# Patient Record
Sex: MALE | Race: WHITE
[De-identification: names, ages, dates, MRNs, and addresses within clinical notes are randomized per-mention and may not be internally consistent; named-entity substitution may affect disease eponyms.]

---

## 2018-08-17 ENCOUNTER — HOSPITAL ENCOUNTER (INPATIENT)
Dept: HOSPITAL 45 - C.EDB | Age: 78
LOS: 2 days | Discharge: HOME | DRG: 176 | End: 2018-08-19
Attending: FAMILY MEDICINE | Admitting: FAMILY MEDICINE
Payer: COMMERCIAL

## 2018-08-17 VITALS
OXYGEN SATURATION: 94 % | HEART RATE: 68 BPM | SYSTOLIC BLOOD PRESSURE: 149 MMHG | DIASTOLIC BLOOD PRESSURE: 82 MMHG | TEMPERATURE: 98.96 F

## 2018-08-17 VITALS
TEMPERATURE: 98.96 F | SYSTOLIC BLOOD PRESSURE: 171 MMHG | OXYGEN SATURATION: 94 % | HEART RATE: 53 BPM | DIASTOLIC BLOOD PRESSURE: 84 MMHG

## 2018-08-17 VITALS
OXYGEN SATURATION: 95 % | DIASTOLIC BLOOD PRESSURE: 96 MMHG | SYSTOLIC BLOOD PRESSURE: 179 MMHG | TEMPERATURE: 97.7 F | HEART RATE: 68 BPM

## 2018-08-17 VITALS
HEART RATE: 68 BPM | DIASTOLIC BLOOD PRESSURE: 79 MMHG | OXYGEN SATURATION: 93 % | TEMPERATURE: 98.06 F | SYSTOLIC BLOOD PRESSURE: 175 MMHG

## 2018-08-17 VITALS — DIASTOLIC BLOOD PRESSURE: 96 MMHG | HEART RATE: 68 BPM | TEMPERATURE: 97.7 F | SYSTOLIC BLOOD PRESSURE: 179 MMHG

## 2018-08-17 VITALS — OXYGEN SATURATION: 95 %

## 2018-08-17 VITALS
BODY MASS INDEX: 24.66 KG/M2 | BODY MASS INDEX: 24.66 KG/M2 | WEIGHT: 182.1 LBS | HEIGHT: 72 IN | WEIGHT: 182.1 LBS | HEIGHT: 72 IN

## 2018-08-17 VITALS — OXYGEN SATURATION: 94 %

## 2018-08-17 DIAGNOSIS — Z88.8: ICD-10-CM

## 2018-08-17 DIAGNOSIS — M10.9: ICD-10-CM

## 2018-08-17 DIAGNOSIS — M35.3: ICD-10-CM

## 2018-08-17 DIAGNOSIS — I25.10: ICD-10-CM

## 2018-08-17 DIAGNOSIS — Z83.3: ICD-10-CM

## 2018-08-17 DIAGNOSIS — Z95.0: ICD-10-CM

## 2018-08-17 DIAGNOSIS — I26.99: Primary | ICD-10-CM

## 2018-08-17 DIAGNOSIS — I13.0: ICD-10-CM

## 2018-08-17 DIAGNOSIS — I50.22: ICD-10-CM

## 2018-08-17 DIAGNOSIS — N18.3: ICD-10-CM

## 2018-08-17 DIAGNOSIS — N40.1: ICD-10-CM

## 2018-08-17 DIAGNOSIS — R35.0: ICD-10-CM

## 2018-08-17 DIAGNOSIS — G30.9: ICD-10-CM

## 2018-08-17 DIAGNOSIS — F02.80: ICD-10-CM

## 2018-08-17 DIAGNOSIS — I25.5: ICD-10-CM

## 2018-08-17 LAB
ALBUMIN SERPL-MCNC: 3 GM/DL (ref 3.4–5)
ALP SERPL-CCNC: 62 U/L (ref 45–117)
ALT SERPL-CCNC: 23 U/L (ref 12–78)
AST SERPL-CCNC: 15 U/L (ref 15–37)
BASOPHILS # BLD: 0.02 K/UL (ref 0–0.2)
BASOPHILS NFR BLD: 0.2 %
BUN SERPL-MCNC: 21 MG/DL (ref 7–18)
CALCIUM SERPL-MCNC: 9 MG/DL (ref 8.5–10.1)
CO2 SERPL-SCNC: 28 MMOL/L (ref 21–32)
CREAT SERPL-MCNC: 0.91 MG/DL (ref 0.6–1.4)
EOS ABS #: 0.07 K/UL (ref 0–0.5)
EOSINOPHIL NFR BLD AUTO: 161 K/UL (ref 130–400)
GLUCOSE SERPL-MCNC: 96 MG/DL (ref 70–99)
HCT VFR BLD CALC: 43.3 % (ref 42–52)
HGB BLD-MCNC: 13.8 G/DL (ref 14–18)
IG#: 0.06 K/UL (ref 0–0.02)
IMM GRANULOCYTES NFR BLD AUTO: 13.4 %
INR PPP: 1 (ref 0.9–1.1)
LIPASE: 81 U/L (ref 73–393)
LYMPHOCYTES # BLD: 1.56 K/UL (ref 1.2–3.4)
MCH RBC QN AUTO: 30.7 PG (ref 25–34)
MCHC RBC AUTO-ENTMCNC: 31.9 G/DL (ref 32–36)
MCV RBC AUTO: 96.4 FL (ref 80–100)
MONO ABS #: 1.08 K/UL (ref 0.11–0.59)
MONOCYTES NFR BLD: 9.3 %
NEUT ABS #: 8.83 K/UL (ref 1.4–6.5)
NEUTROPHILS # BLD AUTO: 0.6 %
NEUTROPHILS NFR BLD AUTO: 76 %
PMV BLD AUTO: 10.1 FL (ref 7.4–10.4)
POTASSIUM SERPL-SCNC: 3.8 MMOL/L (ref 3.5–5.1)
PROT SERPL-MCNC: 7 GM/DL (ref 6.4–8.2)
PTT PATIENT: 25.1 SECONDS (ref 21–31)
PTT PATIENT: 67.1 SECONDS (ref 21–31)
RED CELL DISTRIBUTION WIDTH CV: 14.2 % (ref 11.5–14.5)
RED CELL DISTRIBUTION WIDTH SD: 50.7 FL (ref 36.4–46.3)
SODIUM SERPL-SCNC: 143 MMOL/L (ref 136–145)
WBC # BLD AUTO: 11.62 K/UL (ref 4.8–10.8)

## 2018-08-17 RX ADMIN — HEPARIN SODIUM SCH MLS/HR: 5000 INJECTION, SOLUTION INTRAVENOUS at 17:45

## 2018-08-17 RX ADMIN — MEMANTINE HYDROCHLORIDE SCH MG: 10 TABLET ORAL at 19:30

## 2018-08-17 NOTE — HISTORY AND PHYSICAL
History & Physical


Date & Time of Service:


Aug 17, 2018 at 15:25


Chief Complaint:


Chest Pain


Primary Care Physician:


No Doctor, Assigned


History of Present Illness


Source:  patient, family, hospital records


This is a 78 year old male with dementia and a past medical history of multi-

vessel CAD managed medically, ischemic cardiomyopathy and mild chronic systolic 

CHF with EF around 40-45%, complete heart block s/p permanent pacemaker in situ

, PMR on long-term steroids, gout, BPH - presents with R sided chest pain. 

Patient is coming from the [facility]. Most of the history is obtained by the 

patient's wife who is at bedside due to patient's underlying dementia. Wife 

states that the patient was lifting something heavy and had some R chest pain. 

Initially they thought nothing of it and thought it was related to a muscle 

pull - but the pain persisted. Upon arrival to the ED, chest CT was performed 

and he was found to have an extensive bilateral PE. Patient is laying 

comfortably. Requiring oxygen use as his O2 saturations were in the upper 80s 

on room air. Denies any other issues at this time.





Past Medical/Surgical History


Medical Problems:


(1) Alzheimer disease


(2) Atrioventricular block, complete


(3) BPH without obstruction/lower urinary tract symptoms


(4) Cardiac pacemaker in situ


(5) Coronary artery disease involving native coronary artery


(6) Gout


(7) HTN, goal below 140/90


(8) Kidney disease, chronic, stage III (GFR 30-59 ml/min)


(9) PMR (polymyalgia rheumatica)








Family History





FH: diabetes mellitus


FH: heart disease


High blood pressure





Social History


Smoking Status:  Unknown if Ever Smoked


Marital Status:  


Occupational Status:  retired





Allergies


Coded Allergies:  


     ACE Inhibitors (Unverified  Allergy, Unknown, cough, 8/17/18)


     Donepezil (Unverified  Allergy, Unknown, hallucination , 8/17/18)





Home Medications


Scheduled


Allopurinol (Zyloprim), 300 MG PO DAILY


Aspirin (Aspirin Ec), 81 MG PO DAILY


Atenolol (Tenormin), 50 MG PO QPM


Atorvastatin (Lipitor), 80 MG PO DAILY


Calcium/Vitamin D (Os-Jayy 500 Plus D), 1 TAB PO DAILY


Finasteride (Proscar), 5 MG PO DAILY


Losartan Potassium (Cozaar), 50 MG PO DAILY


Memantine (Namenda), 10 MG PO BID


Nitroglycerin (Nitrostat), 0.4 MG UT PRN


Pantoprazole (Protonix), 40 MG PO DAILY


Prednisone (Prednisone), 7.5 MG PO DAILY





Scheduled PRN


Hydrocodone/Acetaminophen 5MG/325MG (Norco 5MG/325MG), 1 TABLET PO Q6 PRN for 

Pain





Review of Systems


Difficult to obtain due to patient's mental status





Physical Exam


Vital Signs











  Date Time  Temp Pulse Resp B/P (MAP) Pulse Ox O2 Delivery O2 Flow Rate FiO2


 


8/17/18 15:02  66 16 161/86 97 Nasal Cannula 3.0 


 


8/17/18 13:37  62 24 112/78 98 Nasal Cannula 3.0 


 


8/17/18 12:47  77      


 


8/17/18 12:15     94 Nasal Cannula 2.0 


 


8/17/18 12:15     92 Nasal Cannula 2.0 


 


8/17/18 12:15 36.6 68 18 92/71 88 Room Air  


 


8/17/18 12:15     88 Room Air  








General Appearance:  no apparent distress, + pertinent finding (pleasantly 

demented)


Head:  normocephalic, atraumatic


Eyes:  normal inspection


ENT:  hearing grossly normal


Neck:  supple


Respiratory/Chest:  no respiratory distress, no accessory muscle use, + 

decreased breath sounds, + pertinent finding (+pacemaker, L chest wall)


Cardiovascular:  regular rate, rhythm, no edema, no murmur


Abdomen/GI:  normal bowel sounds, non tender, soft


Back:  no CVA tenderness, no muscle spasm


Extremities/Musculoskelatal:  normal inspection, no calf tenderness, normal 

capillary refill, no pedal edema, normal range of motion


Neurologic/Psych:  CNs II-XII nml as tested, no motor/sensory deficits, alert, 

normal mood/affect, oriented x 3


Skin:  normal color


Lymphatic:  no adenopathy





Diagnostics


Laboratory Results





Results Past 24 Hours








Test


  8/17/18


11:53 Range/Units


 


 


White Blood Count 11.62 4.8-10.8  K/uL


 


Red Blood Count 4.49 4.7-6.1  M/uL


 


Hemoglobin 13.8 14.0-18.0  g/dL


 


Hematocrit 43.3 42-52  %


 


Mean Corpuscular Volume 96.4   fL


 


Mean Corpuscular Hemoglobin 30.7 25-34  pg


 


Mean Corpuscular Hemoglobin


Concent 31.9


  32-36  g/dl


 


 


Platelet Count 161 130-400  K/uL


 


Mean Platelet Volume 10.1 7.4-10.4  fL


 


Neutrophils (%) (Auto) 76.0  %


 


Lymphocytes (%) (Auto) 13.4  %


 


Monocytes (%) (Auto) 9.3  %


 


Eosinophils (%) (Auto) 0.6  %


 


Basophils (%) (Auto) 0.2  %


 


Neutrophils # (Auto) 8.83 1.4-6.5  K/uL


 


Lymphocytes # (Auto) 1.56 1.2-3.4  K/uL


 


Monocytes # (Auto) 1.08 0.11-0.59  K/uL


 


Eosinophils # (Auto) 0.07 0-0.5  K/uL


 


Basophils # (Auto) 0.02 0-0.2  K/uL


 


RDW Standard Deviation 50.7 36.4-46.3  fL


 


RDW Coefficient of Variation 14.2 11.5-14.5  %


 


Immature Granulocyte % (Auto) 0.5  %


 


Immature Granulocyte # (Auto) 0.06 0.00-0.02  K/uL


 


Prothrombin Time


  10.7


  9.0-12.0


SECONDS


 


Prothromb Time International


Ratio 1.0


  0.9-1.1  


 


 


Activated Partial


Thromboplast Time 25.1


  21.0-31.0


SECONDS


 


Partial Thromboplastin Ratio 1.0  


 


Sodium Level 143 136-145  mmol/L


 


Potassium Level 3.8 3.5-5.1  mmol/L


 


Chloride Level 107   mmol/L


 


Carbon Dioxide Level 28 21-32  mmol/L


 


Anion Gap 7.0 3-11  mmol/L


 


Blood Urea Nitrogen 21 7-18  mg/dl


 


Creatinine


  0.91


  0.60-1.40


mg/dl


 


Est Creatinine Clear Calc


Drug Dose 73.4


   ml/min


 


 


Estimated GFR (


American) 93.2


   


 


 


Estimated GFR (Non-


American 80.4


   


 


 


BUN/Creatinine Ratio 22.6 10-20  


 


Random Glucose 96 70-99  mg/dl


 


Calcium Level 9.0 8.5-10.1  mg/dl


 


Total Bilirubin 1.0 0.2-1  mg/dl


 


Direct Bilirubin 0.3 0-0.2  mg/dl


 


Aspartate Amino Transf


(AST/SGOT) 15


  15-37  U/L


 


 


Alanine Aminotransferase


(ALT/SGPT) 23


  12-78  U/L


 


 


Alkaline Phosphatase 62   U/L


 


Troponin I < 0.015 0-0.045  ng/ml


 


Pro-B-Type Natriuretic Peptide 1709 0-1800  pg/ml


 


Total Protein 7.0 6.4-8.2  gm/dl


 


Albumin 3.0 3.4-5.0  gm/dl


 


Lipase 81   U/L











Diagnostic Radiology


CHEST ONE VIEW PORTABLE





CLINICAL HISTORY: CHEST PAIN dyspnea





COMPARISON STUDY:  No previous studies for comparison.





FINDINGS: Mild cardiomegaly.





Bipolar cardiac pacemaker. Diaphragms smooth. Lungs are clear. 





IMPRESSION:  Negative chest. 





(CHEST FOR PE) ANGIO WITH





CLINICAL HISTORY: 78 years-old Male presenting with 


^right sided CP, hypoxia. 





TECHNIQUE: Multidetector CT angiography of the chest was performed after


administration of intravenous contrast. 3-D volumetric and/or maximum intensity


projection (MIP) images were subsequently reconstructed for review. IV contrast:


103 mL of Optiray. A dose lowering technique was used consistent with the


principles of ALARA (as low as reasonably achievable).





COMPARISON: 8/17/2018.





CT DOSE (mGy.cm): The estimated cumulative dose is 416.95 mGy.cm.





FINDINGS:





 topogram: Left subclavian pacer with leads to the right atrium and right


ventricular apex. Cardiomegaly.





Pulmonary vasculature:





The study is adequate for assessment of the pulmonary vascular tree. Filling


defect consistent with acute pulmonary embolus in the distal right main


pulmonary artery extending significantly into the right upper lobe pulmonary


artery and its segmental branch vessels. In the distal right interlobar artery


there is also extensive acute pulmonary emboli. The left upper lobe is


essentially free from significant emboli. The left lower lobe demonstrates a


lesser degree of acute embolus burden. Main pulmonary artery top normal in size.


No flattening of the interventricular septum. No intracardiac filling defect. No


reflux of contrast into the hepatic veins.





Remaining chest:





On soft tissue windows, normal thyroid and thoracic inlet. Few subcentimeter


mediastinal lymph nodes, likely reactive. Aneurysmal dilatation of the ascending


aorta, which measures 4.9 cm in diameter. Atherosclerosis of the aortic arch.


Multichamber enlargement of the heart. Coronary artery and aortic valve


calcification. Small right pleural effusion. Borderline hepatic steatosis.





On lung windows, patchy groundglass opacity in a dependent distribution.


Additionally, there is peripheral or focal consolidation in the posterior


segment of the right upper lobe (series 4 image 149). Mild interlobular septal


thickening. Thickening of the peribronchovascular bundles. No pneumothorax.





On bone windows, degenerative changes of the spine.





IMPRESSION:


1.  Extensive acute pulmonary embolus burden bilaterally greater in the right


lung. No CT evidence of right heart strain at this time. Possible developing


pulmonary infarct in the posterior segment of the right upper lobe.





2.  Congestive changes in the lungs with either developing pulmonary edema or


extensive atelectasis.





3.  Small right pleural effusion.





4.  Cardiomegaly.





5.  Ascending aortic aneurysm measuring 4.9 cm in diameter.





The report will be called/faxed according to standard departmental protocol.





EKG


AV dual-paced rhythm with occasional ventricular-paced complexes and with 

frequent Premature ventricular


complexes





Impression


Assessment and Plan


This is a 78 year old male with dementia and a past medical history of multi-

vessel CAD managed medically, ischemic cardiomyopathy and mild chronic systolic 

CHF with EF around 40-45%, complete heart block s/p permanent pacemaker in situ

, PMR on long-term steroids, gout, BPH - presents with R sided chest pain and 

subsequently found to have acute bilateral pulmonary embolism.





Acute Bilateral PE


- patient had a chest CT - acute, extensive bilateral PE


- plan to start IV heparin


- case management consulted to find the cost of Xarelto vs. Eliquis


- wife does not want to start patient on Coumadin


- will obtain echo


- oxygen as needed


- pain medications as needed





Multi-vessel CAD


- managed medically


- last cardiac cath in September 2017


- continue aspirin, statin, b-blocker





Ischemic Cardiomyopathy


Mild Chronic Systolic CHF


- EF ~ 40-45%


- will check an echo


- avoiding IV fluids for now


- continue b-blocker and ARB





Complete Heart Block s/p PPM





Polymyalgia Rheumatica


- continue steroid use





BPH


- continue home medications


- check UA; wife states he's having more urinary frequency





FULL CODE





Resuscitation Status








VTE Prophylaxis


Will order VTE Prophylaxis:  Yes

## 2018-08-17 NOTE — DIAGNOSTIC IMAGING REPORT
(CHEST FOR PE) ANGIO WITH



CLINICAL HISTORY: 78 years-old Male presenting with 

^right sided CP, hypoxia. 



TECHNIQUE: Multidetector CT angiography of the chest was performed after

administration of intravenous contrast. 3-D volumetric and/or maximum intensity

projection (MIP) images were subsequently reconstructed for review. IV contrast:

103 mL of Optiray. A dose lowering technique was used consistent with the

principles of ALARA (as low as reasonably achievable).



COMPARISON: 8/17/2018.



CT DOSE (mGy.cm): The estimated cumulative dose is 416.95 mGy.cm.



FINDINGS:



 topogram: Left subclavian pacer with leads to the right atrium and right

ventricular apex. Cardiomegaly.



Pulmonary vasculature:



The study is adequate for assessment of the pulmonary vascular tree. Filling

defect consistent with acute pulmonary embolus in the distal right main

pulmonary artery extending significantly into the right upper lobe pulmonary

artery and its segmental branch vessels. In the distal right interlobar artery

there is also extensive acute pulmonary emboli. The left upper lobe is

essentially free from significant emboli. The left lower lobe demonstrates a

lesser degree of acute embolus burden. Main pulmonary artery top normal in size.

No flattening of the interventricular septum. No intracardiac filling defect. No

reflux of contrast into the hepatic veins.



Remaining chest:



On soft tissue windows, normal thyroid and thoracic inlet. Few subcentimeter

mediastinal lymph nodes, likely reactive. Aneurysmal dilatation of the ascending

aorta, which measures 4.9 cm in diameter. Atherosclerosis of the aortic arch.

Multichamber enlargement of the heart. Coronary artery and aortic valve

calcification. Small right pleural effusion. Borderline hepatic steatosis.



On lung windows, patchy groundglass opacity in a dependent distribution.

Additionally, there is peripheral or focal consolidation in the posterior

segment of the right upper lobe (series 4 image 149). Mild interlobular septal

thickening. Thickening of the peribronchovascular bundles. No pneumothorax.



On bone windows, degenerative changes of the spine.



IMPRESSION:

1.  Extensive acute pulmonary embolus burden bilaterally greater in the right

lung. No CT evidence of right heart strain at this time. Possible developing

pulmonary infarct in the posterior segment of the right upper lobe.



2.  Congestive changes in the lungs with either developing pulmonary edema or

extensive atelectasis.



3.  Small right pleural effusion.



4.  Cardiomegaly.



5.  Ascending aortic aneurysm measuring 4.9 cm in diameter.



The report will be called/faxed according to standard departmental protocol.







Electronically signed by:  Rajeev Gomez M.D.

8/17/2018 1:56 PM



Dictated Date/Time:  8/17/2018 1:48 PM

## 2018-08-17 NOTE — EMERGENCY ROOM VISIT NOTE
History


Report prepared by Philippe:  Kusum Orlando


Under the Supervision of:  Dr. Vladimir Phillips M.D.


First contact with patient:  12:17


Stated Complaint:  CHEST PAIN





History of Present Illness


The patient is a 78 year old male who presents to the Emergency Room with 

complaints of right sided chest pain beginning this morning. As per EMS, the 

patient was camping at [facility] and when he woke up this morning, he had 

right sided chest pain that progressively worsened. EMS states the pt rates his 

pain as an 8/10 in severity and when they got to the patient, he was gray 

colored, diaphoretic, and had an MI like presentation. The patient denies any 

nausea or headaches. HPI and ROS limited secondary to the patient's dementia.





   Source of History:  patient, EMS


   History Limited By:  dementia


   Onset:  this morning


   Position:  chest (right)


   Symptom Intensity:  8/10 in severity


   Quality:  other (chest pain)


   Timing:  worsening


   Associated Symptoms:  No headache, No nausea





Review of Systems


See HPI for pertinent positives and negatives.  HPI and ROS limited secondary 

to the patient's dementia.





Past Medical & Surgical


Medical Problems:


(1) Alzheimer disease


(2) Atrioventricular block, complete


(3) BPH without obstruction/lower urinary tract symptoms


(4) Cardiac pacemaker in situ


(5) Coronary artery disease involving native coronary artery


(6) Gout


(7) HTN, goal below 140/90


(8) Kidney disease, chronic, stage III (GFR 30-59 ml/min)


(9) PMR (polymyalgia rheumatica)








Family History





FH: diabetes mellitus


FH: heart disease


High blood pressure





Social History


Marital Status:  


Housing Status:  lives with significant other


Occupation Status:  retired





Current/Historical Medications


Scheduled


Allopurinol (Zyloprim), 300 MG PO DAILY


Aspirin (Aspirin Ec), 81 MG PO DAILY


Atenolol (Tenormin), 50 MG PO QPM


Atorvastatin (Lipitor), 80 MG PO DAILY


Calcium/Vitamin D (Os-Jayy 500 Plus D), 1 TAB PO DAILY


Finasteride (Proscar), 5 MG PO DAILY


Losartan Potassium (Cozaar), 50 MG PO DAILY


Memantine (Namenda), 10 MG PO BID


Nitroglycerin (Nitrostat), 0.4 MG UT PRN


Pantoprazole (Protonix), 40 MG PO DAILY


Prednisone (Prednisone), 7.5 MG PO DAILY





Scheduled PRN


Hydrocodone/Acetaminophen 5MG/325MG (Norco 5MG/325MG), 1 TABLET PO Q6 PRN for 

Pain





Allergies


Coded Allergies:  


     ACE Inhibitors (Unverified  Allergy, Unknown, cough, 8/17/18)


     Donepezil (Unverified  Allergy, Unknown, hallucination , 8/17/18)





Physical Exam


Vital Signs











  Date Time  Temp Pulse Resp B/P (MAP) Pulse Ox O2 Delivery O2 Flow Rate FiO2


 


8/17/18 13:37  62 24 112/78 98 Nasal Cannula 3.0 


 


8/17/18 12:47  77      


 


8/17/18 12:15     94 Nasal Cannula 2.0 


 


8/17/18 12:15     92 Nasal Cannula 2.0 


 


8/17/18 12:15 36.6 68 18 92/71 88 Room Air  


 


8/17/18 12:15     88 Room Air  











Physical Exam


GENERAL: Awake, alert, oriented to person only. 


HENT: Normocephalic, atraumatic. Oropharynx unremarkable.


EYES: Normal conjunctiva. Sclera non-icteric.


NECK: Supple. No nuchal rigidity. 


RESPIRATORY: Clear to auscultation. No wheezes. Normal respiratory effort.


CARDIAC: Normal rate. Irregular rhythm. Extremities warm and well perfused.


GI: Soft, non-distended. No tenderness to palpation. No rebound or guarding. No 

masses.


RECTAL: Deferred.


MUSCULOSKELETAL: Atraumatic. Chest examination reveals no tenderness. There is 

no CVA tenderness to palpation. 


LOWER EXTREMITIES: Calves are equal size bilaterally and non-tender. No edema


NEURO: Normal sensorium. No sensory or motor deficits noted. No facial droop.


SKIN: Warm and dry. No rash or jaundice noted.





Medical Decision & Procedures


ER Provider


Diagnostic Interpretation:


Radiology results as stated below per my review and radiologist interpretation: 





CHEST ONE VIEW PORTABLE





CLINICAL HISTORY: CHEST PAIN dyspnea





COMPARISON STUDY:  No previous studies for comparison.





FINDINGS: Mild cardiomegaly.





Bipolar cardiac pacemaker. Diaphragms smooth. Lungs are clear. 





IMPRESSION:  Negative chest. 








The above report was generated using voice recognition software.  It may contain


grammatical, syntax or spelling errors.








Electronically signed by:  Bryan Ozuna M.D.


8/17/2018 12:35 PM











(CHEST FOR PE) ANGIO WITH





CLINICAL HISTORY: 78 years-old Male presenting with 


^right sided CP, hypoxia. 





TECHNIQUE: Multidetector CT angiography of the chest was performed after


administration of intravenous contrast. 3-D volumetric and/or maximum intensity


projection (MIP) images were subsequently reconstructed for review. IV contrast:


103 mL of Optiray. A dose lowering technique was used consistent with the


principles of ALARA (as low as reasonably achievable).





COMPARISON: 8/17/2018.





CT DOSE (mGy.cm): The estimated cumulative dose is 416.95 mGy.cm.





FINDINGS:





 topogram: Left subclavian pacer with leads to the right atrium and right


ventricular apex. Cardiomegaly.





Pulmonary vasculature:





The study is adequate for assessment of the pulmonary vascular tree. Filling


defect consistent with acute pulmonary embolus in the distal right main


pulmonary artery extending significantly into the right upper lobe pulmonary


artery and its segmental branch vessels. In the distal right interlobar artery


there is also extensive acute pulmonary emboli. The left upper lobe is


essentially free from significant emboli. The left lower lobe demonstrates a


lesser degree of acute embolus burden. Main pulmonary artery top normal in size.


No flattening of the interventricular septum. No intracardiac filling defect. No


reflux of contrast into the hepatic veins.





Remaining chest:





On soft tissue windows, normal thyroid and thoracic inlet. Few subcentimeter


mediastinal lymph nodes, likely reactive. Aneurysmal dilatation of the ascending


aorta, which measures 4.9 cm in diameter. Atherosclerosis of the aortic arch.


Multichamber enlargement of the heart. Coronary artery and aortic valve


calcification. Small right pleural effusion. Borderline hepatic steatosis.





On lung windows, patchy groundglass opacity in a dependent distribution.


Additionally, there is peripheral or focal consolidation in the posterior


segment of the right upper lobe (series 4 image 149). Mild interlobular septal


thickening. Thickening of the peribronchovascular bundles. No pneumothorax.





On bone windows, degenerative changes of the spine.





IMPRESSION:


1.  Extensive acute pulmonary embolus burden bilaterally greater in the right


lung. No CT evidence of right heart strain at this time. Possible developing


pulmonary infarct in the posterior segment of the right upper lobe.





2.  Congestive changes in the lungs with either developing pulmonary edema or


extensive atelectasis.





3.  Small right pleural effusion.





4.  Cardiomegaly.





5.  Ascending aortic aneurysm measuring 4.9 cm in diameter.





The report will be called/faxed according to standard departmental protocol.








Electronically signed by:  Rajeev Gomez M.D.


8/17/2018 1:56 PM





Laboratory Results


8/17/18 11:53








Red Blood Count 4.49, Mean Corpuscular Volume 96.4, Mean Corpuscular Hemoglobin 

30.7, Mean Corpuscular Hemoglobin Concent 31.9, Mean Platelet Volume 10.1, 

Neutrophils (%) (Auto) 76.0, Lymphocytes (%) (Auto) 13.4, Monocytes (%) (Auto) 

9.3, Eosinophils (%) (Auto) 0.6, Basophils (%) (Auto) 0.2, Neutrophils # (Auto) 

8.83, Lymphocytes # (Auto) 1.56, Monocytes # (Auto) 1.08, Eosinophils # (Auto) 

0.07, Basophils # (Auto) 0.02





8/17/18 11:53

















Test


  8/17/18


11:53


 


White Blood Count


  11.62 K/uL


(4.8-10.8)


 


Red Blood Count


  4.49 M/uL


(4.7-6.1)


 


Hemoglobin


  13.8 g/dL


(14.0-18.0)


 


Hematocrit 43.3 % (42-52) 


 


Mean Corpuscular Volume


  96.4 fL


()


 


Mean Corpuscular Hemoglobin


  30.7 pg


(25-34)


 


Mean Corpuscular Hemoglobin


Concent 31.9 g/dl


(32-36)


 


Platelet Count


  161 K/uL


(130-400)


 


Mean Platelet Volume


  10.1 fL


(7.4-10.4)


 


Neutrophils (%) (Auto) 76.0 % 


 


Lymphocytes (%) (Auto) 13.4 % 


 


Monocytes (%) (Auto) 9.3 % 


 


Eosinophils (%) (Auto) 0.6 % 


 


Basophils (%) (Auto) 0.2 % 


 


Neutrophils # (Auto)


  8.83 K/uL


(1.4-6.5)


 


Lymphocytes # (Auto)


  1.56 K/uL


(1.2-3.4)


 


Monocytes # (Auto)


  1.08 K/uL


(0.11-0.59)


 


Eosinophils # (Auto)


  0.07 K/uL


(0-0.5)


 


Basophils # (Auto)


  0.02 K/uL


(0-0.2)


 


RDW Standard Deviation


  50.7 fL


(36.4-46.3)


 


RDW Coefficient of Variation


  14.2 %


(11.5-14.5)


 


Immature Granulocyte % (Auto) 0.5 % 


 


Immature Granulocyte # (Auto)


  0.06 K/uL


(0.00-0.02)


 


Anion Gap


  7.0 mmol/L


(3-11)


 


Est Creatinine Clear Calc


Drug Dose 73.4 ml/min 


 


 


Estimated GFR (


American) 93.2 


 


 


Estimated GFR (Non-


American 80.4 


 


 


BUN/Creatinine Ratio 22.6 (10-20) 


 


Calcium Level


  9.0 mg/dl


(8.5-10.1)


 


Total Bilirubin


  1.0 mg/dl


(0.2-1)


 


Direct Bilirubin


  0.3 mg/dl


(0-0.2)


 


Aspartate Amino Transf


(AST/SGOT) 15 U/L (15-37) 


 


 


Alanine Aminotransferase


(ALT/SGPT) 23 U/L (12-78) 


 


 


Alkaline Phosphatase


  62 U/L


()


 


Troponin I


  < 0.015 ng/ml


(0-0.045)


 


Pro-B-Type Natriuretic Peptide


  1709 pg/ml


(0-1800)


 


Total Protein


  7.0 gm/dl


(6.4-8.2)


 


Albumin


  3.0 gm/dl


(3.4-5.0)


 


Lipase


  81 U/L


()





Laboratory results reviewed by me





ECG Per My Interpretation


Indication:  chest pain


Rate (beats per minute):  77


Rhythm:  other (AV paced rhythm)


Findings:  other (no acute ST segment elevation, occasional PVC)


Comparison ECG Date:  no prior available





ED Course


1217: The patient was evaluated in room B9. A complete history and physical 

exam was performed.  





1310: I updated the patient and family on lab results. He is currently pain 

free. 





1411: Discussed the patient's case with ALDO Dolan . 

The patient will be evaluated for further treatment and disposition.





Medical Decision


Triage Nursing notes reviewed.





Differential diagnosis:


Etiologies such as cardiac ischemia, aortic dissection, pulmonary embolism, 

pneumonia, pneumothorax, musculoskeletal, infections, pericarditis, myocarditis

, esophageal rupture, gastrointestinal, as well as others were entertained. 





Patient presents by EMS report chest pain this morning.  Nitroglycerin with 

minimal relief of right-sided pain.  EMS was contacted given additional nitro 

and fentanyl with improvement of symptoms.  Has a pacemaker making difficult 

interpretation of EKG.  Troponin was sent.  Minimal right-sided pain now.  

Patient has a history of dementia no prior records here and attempts were made 

to get outside records from [facility].  Benign abdomen and doubt acute intra-

abdominal complication.  No acute mental status change reported. Chest x-ray to 

evaluate for possible pneumonia or pulmonary edema was sent.  Patient with a 

history of PMR on chronic steroids.  Family does report upon their arrival that 

he had some mild discomfort last night worsened today.  Took his hydrocodone 

earlier did not help.  Was lifting some water yesterday they think might have 

provoked this to some degree.  They state he has significant cardiac history 

with multiple blockages referred for possible bypass but not a good candidate 

secondary to his dementia and other comorbidities.  Initial troponin is 

negative here.  This was more than 4 hours after the symptoms.  No evidence of 

acutely decompensated heart failure. CT PE completed with evidence of bilateral 

pulmonary embolisms.  No evidence of right heart strain.  Negative troponin and 

very minimal proBNP.  Explains his hypoxia and some pulmonary infarct of the 

lung explains his pain.  Will start on IV heparin.  Discussed with Rodrigo 

for admission.





Medication Reconcilliation


Current Medication List:  was personally reviewed by me





Blood Pressure Screening


Patient's blood pressure:  Low blood pressure





Consults


Time Called:  1407


Consulting Physician:  ALDO Dolan


Returned Call:  1408


Discussed the patient's case with ALDO Dolan . The 

patient will be evaluated for further treatment and disposition.





Impression





 Primary Impression:  


 Bilateral pulmonary embolism





Critical Care


I have personally spent greater than 30 minutes of critical care time in the 

direct management of this patient.  This includes bedside care, interpretation 

of diagnostic studies, and testing, discussion with consultants, patient, and 

family members, and other required patient management activities.  This 30 

minutes is in excess of all separately billable procedures.





Scribe Attestation


The scribe's documentation has been prepared under my direction and personally 

reviewed by me in its entirety. I confirm that the note above accurately 

reflects all work, treatment, procedures, and medical decision making performed 

by me.





Departure Information


Dispostion


Being Evaluated By Hospitalist (ALDO Dolan)

## 2018-08-17 NOTE — DIAGNOSTIC IMAGING REPORT
CHEST ONE VIEW PORTABLE



CLINICAL HISTORY: CHEST PAIN dyspnea



COMPARISON STUDY:  No previous studies for comparison.



FINDINGS: Mild cardiomegaly.



Bipolar cardiac pacemaker. Diaphragms smooth. Lungs are clear. 



IMPRESSION:  Negative chest. 











The above report was generated using voice recognition software.  It may contain

grammatical, syntax or spelling errors.









Electronically signed by:  Bryan Ozuna M.D.

8/17/2018 12:35 PM



Dictated Date/Time:  8/17/2018 12:34 PM

## 2018-08-18 VITALS
TEMPERATURE: 98.6 F | HEART RATE: 68 BPM | SYSTOLIC BLOOD PRESSURE: 139 MMHG | OXYGEN SATURATION: 96 % | DIASTOLIC BLOOD PRESSURE: 78 MMHG

## 2018-08-18 VITALS
DIASTOLIC BLOOD PRESSURE: 79 MMHG | OXYGEN SATURATION: 97 % | HEART RATE: 64 BPM | SYSTOLIC BLOOD PRESSURE: 142 MMHG | TEMPERATURE: 97.7 F

## 2018-08-18 VITALS
TEMPERATURE: 97.34 F | HEART RATE: 69 BPM | SYSTOLIC BLOOD PRESSURE: 137 MMHG | OXYGEN SATURATION: 96 % | DIASTOLIC BLOOD PRESSURE: 76 MMHG

## 2018-08-18 VITALS — OXYGEN SATURATION: 95 %

## 2018-08-18 VITALS
OXYGEN SATURATION: 95 % | SYSTOLIC BLOOD PRESSURE: 169 MMHG | HEART RATE: 63 BPM | DIASTOLIC BLOOD PRESSURE: 89 MMHG | TEMPERATURE: 98.24 F

## 2018-08-18 VITALS
HEART RATE: 68 BPM | OXYGEN SATURATION: 97 % | TEMPERATURE: 98.24 F | DIASTOLIC BLOOD PRESSURE: 85 MMHG | SYSTOLIC BLOOD PRESSURE: 132 MMHG

## 2018-08-18 VITALS — OXYGEN SATURATION: 98 %

## 2018-08-18 VITALS
DIASTOLIC BLOOD PRESSURE: 87 MMHG | OXYGEN SATURATION: 93 % | TEMPERATURE: 97.88 F | HEART RATE: 62 BPM | SYSTOLIC BLOOD PRESSURE: 154 MMHG

## 2018-08-18 VITALS — OXYGEN SATURATION: 94 %

## 2018-08-18 LAB
BUN SERPL-MCNC: 19 MG/DL (ref 7–18)
CALCIUM SERPL-MCNC: 8.5 MG/DL (ref 8.5–10.1)
CO2 SERPL-SCNC: 29 MMOL/L (ref 21–32)
CREAT SERPL-MCNC: 0.72 MG/DL (ref 0.6–1.4)
EOSINOPHIL NFR BLD AUTO: 135 K/UL (ref 130–400)
GLUCOSE SERPL-MCNC: 95 MG/DL (ref 70–99)
HCT VFR BLD CALC: 38.9 % (ref 42–52)
HGB BLD-MCNC: 12.6 G/DL (ref 14–18)
MCH RBC QN AUTO: 31 PG (ref 25–34)
MCHC RBC AUTO-ENTMCNC: 32.4 G/DL (ref 32–36)
MCV RBC AUTO: 95.8 FL (ref 80–100)
PMV BLD AUTO: 9.7 FL (ref 7.4–10.4)
POTASSIUM SERPL-SCNC: 3.6 MMOL/L (ref 3.5–5.1)
PTT PATIENT: 68.1 SECONDS (ref 21–31)
PTT PATIENT: 92.6 SECONDS (ref 21–31)
RED CELL DISTRIBUTION WIDTH CV: 14 % (ref 11.5–14.5)
RED CELL DISTRIBUTION WIDTH SD: 49.4 FL (ref 36.4–46.3)
SODIUM SERPL-SCNC: 143 MMOL/L (ref 136–145)
WBC # BLD AUTO: 8.31 K/UL (ref 4.8–10.8)

## 2018-08-18 RX ADMIN — DOCUSATE SODIUM SCH MG: 100 CAPSULE, LIQUID FILLED ORAL at 20:03

## 2018-08-18 RX ADMIN — LOSARTAN POTASSIUM SCH MG: 50 TABLET, FILM COATED ORAL at 07:21

## 2018-08-18 RX ADMIN — PANTOPRAZOLE SCH MG: 40 TABLET, DELAYED RELEASE ORAL at 07:19

## 2018-08-18 RX ADMIN — MEMANTINE HYDROCHLORIDE SCH MG: 10 TABLET ORAL at 07:19

## 2018-08-18 RX ADMIN — ALLOPURINOL SCH MG: 300 TABLET ORAL at 07:21

## 2018-08-18 RX ADMIN — Medication SCH TAB: at 07:18

## 2018-08-18 RX ADMIN — FINASTERIDE SCH MG: 5 TABLET, FILM COATED ORAL at 07:21

## 2018-08-18 RX ADMIN — HEPARIN SODIUM SCH MLS/HR: 5000 INJECTION, SOLUTION INTRAVENOUS at 11:40

## 2018-08-18 RX ADMIN — Medication SCH MG: at 07:20

## 2018-08-18 RX ADMIN — MEMANTINE HYDROCHLORIDE SCH MG: 10 TABLET ORAL at 20:05

## 2018-08-18 RX ADMIN — RIVAROXABAN SCH MG: 15 TABLET, FILM COATED ORAL at 20:03

## 2018-08-18 RX ADMIN — ATORVASTATIN CALCIUM SCH MG: 40 TABLET, FILM COATED ORAL at 07:20

## 2018-08-18 NOTE — ECHOCARDIOGRAM REPORT
*NOTICE TO RECEIVING PARTY AGENCY**  This information is strictly Confidential and protected under 
[place] law.  [place] law prohibits you from making any further disclosure of this 
information unless further disclosure is expressly permitted by the written consent of the person to 
whom it pertains or is authorized by law.  A general authorization for the release of medical or 
other information is not sufficient for this purpose.  Hospital accepts no responsibility if the 
information is made available to any other person, INCLUDING THE PATIENT.



Interpretation Summary

  *  Conclusions --

  *  Compared to previous study of 7/26/17: RV dilation and reduced systolic function are now 
present, no change in LV systolic function.

  *  Normal LV chamber size with moderate concentric LVH.

  *  The qualitative LV ejection fraction is 40-44% (mioderatly reduced). There is a large sized 
apical, septal, anteroseptal, anterior, and inferior wall motion abnormality with hypokinesis to 
akinesis of the segments.

  *  Grade I diastolic dysfunction.

  *  The right ventricular cavity size is enlarged (proximal parasternal long axis right ventricular 
outflow tract dimension >3.3 cm). The right ventricular systolic function is reduced as assessed by 
tricuspid annular plane systolic excursion (TAPSE) (TAPSE <1.6 cm).

  *  Aortic valve sclerosis moderate, without significant aortic valvular stenosis. Mild aortic 
regurgitation.

  *  Mild tricuspid regurgitation.

  *  Pulmonary hypertension is present. PASP of 53 mmHg.

  *  Severe biatrial enlargement.

Procedure Details

  *  A complete two-dimensional transthoracic echocardiogram was performed (2D, M-mode, Doppler and 
color flow Doppler).

Left Ventricle

  *  The left ventricle is normal in size.

  *  There is moderate concentric left ventricular hypertrophy.

  *  Left ventricular systolic function is moderately reduced.

  *  There is a large sized apical, septal, anteroseptal, anterior, and inferior wall motion 
abnormality with hypokinesis to akinesis of the segments.

Right Ventricle

  *  The right ventricular cavity size is enlarged (proximal parasternal long axis right ventricular 
outflow tract dimension >3.3 cm).

  *  There is a pacemaker lead in the right ventricle.

  *  The right ventricular systolic function is reduced as assessed by tricuspid annular plane 
systolic excursion (TAPSE) (TAPSE <1.6 cm).

Atria

  *  The left atrium is severely dilated.

  *  The right atrium is severely dilated.

  *  No ASD detected; PFO is not assessed.

Mitral Valve

  *  The mitral valve leaflets appear thickened, but open well.

  *  There is no mitral valve stenosis.

  *  There is no mitral regurgitation noted.

Tricuspid Valve

  *  The tricuspid valve is normal in structure and function.

Aortic Valve

  *  The aortic valve is trileaflet.

  *  Aortic valve sclerosis moderate, without significant aortic valvular stenosis.

  *  Mild aortic regurgitation.

Pulmonic Valve

  *  The pulmonary valve is not well seen, but the Doppler examination is normal without significant 
regurgitation or stenosis.

Great Vessels

  *  The aortic root is normal size.

  *  Moderately dilated ascending aorta.

Pericardium/Pleural

  *  There is no pericardial effusion.

Left Ventricular Diastolic Function

  *  Grade I diastolic dysfunction, (abnormal relaxation pattern).



MMode 2D Measurements and Calculations

IVSd 1.6 cm

IVSs 2.2 cm



LVIDd 4.9 cm

LVIDs 4.3 cm

LVPWd 1.6 cm

LVPWs 1.7 cm



IVS/LVPW 0.98 

FS 13.0 %

EDV(Teich) 114.2 ml

ESV(Teich) 82.5 ml

EF(Teich) 27.8 %



EDV(cubed) 119.5 ml

ESV(cubed) 78.8 ml

EF(cubed) 34.1 %

% IVS thick 43.0 %

% LVPW thick 8.9 %





LV mass(C)d 336.5 grams

LV mass(C)dI 165.2 grams/m\S\2

LV mass(C)s 398.3 grams

LV mass(C)sI 195.5 grams/m\S\2



SV(Teich) 31.7 ml

SI(Teich) 15.6 ml/m\S\2

SV(cubed) 40.7 ml

SI(cubed) 20.0 ml/m\S\2



Ao root diam 3.7 cm

Ao root area 10.5 cm\S\2

ACS 2.2 cm

LA dimension 3.7 cm



LA/Ao 1.0 

LVOT diam 2.3 cm

LVOT area 4.1 cm\S\2





LVAd ap4 41.5 cm\S\2

LVLd ap4 8.7 cm

EDV(MOD-sp4) 160.4 ml

EDV(sp4-el) 168.8 ml

LVAs ap4 26.3 cm\S\2

LVLs ap4 7.7 cm

ESV(MOD-sp4) 75.6 ml

ESV(sp4-el) 76.6 ml

EF(MOD-sp4) 52.9 %

EF(sp4-el) 54.6 %



LVAd ap2 43.7 cm\S\2

LVLd ap2 8.9 cm

EDV(MOD-sp2) 175.4 ml

EDV(sp2-el) 182.6 ml

LVAs ap2 32.1 cm\S\2

LVLs ap2 8.1 cm

ESV(MOD-sp2) 106.4 ml

ESV(sp2-el) 107.7 ml

EF(MOD-sp2) 39.4 %

EF(sp2-el) 41.0 %



LVLd %diff 2.3 %

EDV(MOD-bp) 170.2 ml

LVLs %diff 5.7 %

ESV(MOD-bp) 92.5 ml

EF(MOD-bp) 45.6 %



SV(MOD-sp4) 84.8 ml

SI(MOD-sp4) 41.6 ml/m\S\2





SV(MOD-sp2) 69.0 ml

SI(MOD-sp2) 33.9 ml/m\S\2



SV(MOD-bp) 77.7 ml

SI(MOD-bp) 38.1 ml/m\S\2



SV(sp4-el) 92.2 ml

SI(sp4-el) 45.3 ml/m\S\2



SV(sp2-el) 74.9 ml

SI(sp2-el) 36.8 ml/m\S\2







Doppler Measurements and Calculations

MV E max shama 49.6 cm/sec

MV A max shama 71.2 cm/sec



MV E/A 0.70 



MV P1/2t max shama 63.0 cm/sec

MV P1/2t 96.2 msec

MVA(P1/2t) 2.3 cm\S\2

MV dec slope 191.7 cm/sec\S\2

MV dec time 0.26 sec



Ao V2 max 164.4 cm/sec

Ao max PG 10.8 mmHg

Ao max PG (full) 8.7 mmHg

KATHRIN(V,A) 1.8 cm\S\2

KATHRIN(V,D) 1.8 cm\S\2





AI max shama 385.2 cm/sec

AI max PG 59.4 mmHg

AI dec slope 125.7 cm/sec\S\2

AI P1/2t 897.8 msec



LV V1 max PG 2.2 mmHg



LV V1 max 73.3 cm/sec



MR max shama 594.9 cm/sec

MR max .6 mmHg





TR max shama 336.5 cm/sec

## 2018-08-18 NOTE — PROGRESS NOTE
Subjective


Date of Service:


Aug 18, 2018.


Subjective


Pt evaluation today including:  conversation w/ patient, conversation w/ family

, physical exam, lab review, review of studies, review of inpatient medication 

list


Saw/examined the patient in room 238


Doing well, he is pleasantly demented, but in no distress


Last evening, as per wife, he had a lot of pain


the pain has since subsided and currently he is laying and making occasional 

jokes


As per wife, he seems to be breathing better; currently on 2L of O2 via NC





Problem List


Medical Problems:


(1) Bilateral pulmonary embolism


Status: Acute  











Medications





Current Inpatient Medications








 Medications


  (Trade)  Dose


 Ordered  Sig/Anastacio


 Route  Start Time


 Stop Time Status Last Admin


Dose Admin


 


 Ioversol


  (Optiray 320)  100 ml  UD  PRN


 IV  8/17/18 13:30


 8/21/18 13:29   


 


 


 Acetaminophen


  (Tylenol Tab)  650 mg  Q4H  PRN


 PO  8/17/18 15:30


 9/16/18 15:29   


 


 


 Al Hydrox/Mg


 Hydrox/Simethicone


  (Maalox Max Susp)  15 ml  Q4H  PRN


 PO  8/17/18 15:30


 9/16/18 15:29   


 


 


 Magnesium


 Hydroxide


  (Milk Of


 Magnesia Susp)  30 ml  Q12H  PRN


 PO  8/17/18 15:30


 9/16/18 15:29   


 


 


 Ondansetron HCl


  (Zofran Inj)  4 mg  Q6H  PRN


 IV  8/17/18 15:30


 9/16/18 15:29   


 


 


 Allopurinol


  (Zyloprim Tab)  300 mg  DAILY


 PO  8/18/18 09:00


 9/17/18 08:59  8/18/18 07:21


300 MG


 


 Aspirin


  (Ecotrin Tab)  81 mg  DAILY


 PO  8/18/18 09:00


 9/17/18 08:59  8/18/18 07:20


81 MG


 


 Atenolol


  (Tenormin Tab)  50 mg  QPM


 PO  8/17/18 21:00


 9/16/18 20:59  8/17/18 19:30


50 MG


 


 Atorvastatin


 Calcium


  (Lipitor Tab)  80 mg  DAILY


 PO  8/18/18 09:00


 9/17/18 08:59  8/18/18 07:20


80 MG


 


 Calcium/Vitamin D


  (Caltrate Plus


 Tab)  1 tab  DAILY


 PO  8/18/18 09:00


 9/17/18 08:59  8/18/18 07:18


1 TAB


 


 Finasteride


  (Proscar Tab)  5 mg  DAILY


 PO  8/18/18 09:00


 9/17/18 08:59  8/18/18 07:21


5 MG


 


 Acetaminophen/


 Hydrocodone Bitart


  (Norco 5/325 Tab)  1 tab  Q6  PRN


 PO  8/17/18 15:30


 8/31/18 15:29  8/17/18 23:24


1 TAB


 


 Losartan Potassium


  (coZAAR TAB)  50 mg  DAILY


 PO  8/18/18 09:00


 9/17/18 08:59  8/18/18 07:21


50 MG


 


 Memantine


  (Namenda Tab)  10 mg  BID


 PO  8/17/18 21:00


 9/16/18 20:59  8/18/18 07:19


10 MG


 


 Nitroglycerin


  (Nitrostat Tab)  0.4 mg  UD  PRN


 UT  8/17/18 15:30


 9/16/18 15:29   


 


 


 Pantoprazole


 Sodium


  (Protonix Tab)  40 mg  DAILY


 PO  8/18/18 09:00


 9/17/18 08:59  8/18/18 07:19


40 MG


 


 Prednisone


  (PredniSONE TAB)  7.5 mg  DAILY


 PO  8/18/18 09:00


 9/17/18 08:59  8/18/18 07:18


7.5 MG


 


 Heparin Sodium/


 Dextrose  500 ml @ 


 26 mls/hr  [id number]


 IV  8/17/18 17:45


 8/18/18 21:00  8/18/18 11:40


26 MLS/HR


 


 Morphine Sulfate


  (MoRPHine


 SULFATE INJ)  4 mg  Q3H  PRN


 IV  8/17/18 23:30


 8/31/18 21:29   


 


 


 Rivaroxaban


  (Xarelto Tab)  15 mg  BID


 PO  8/18/18 21:00


 9/17/18 20:59   


 


 


 Miscellaneous


  (Stop Order)  1 ea  TODAY@2100


 N/A  8/18/18 21:00


 8/18/18 22:00   


 











Objective


Vital Signs











  Date Time  Temp Pulse Resp B/P (MAP) Pulse Ox O2 Delivery O2 Flow Rate FiO2


 


8/18/18 14:57 36.8 68 20 132/85 (101) 97 Nasal Cannula 3.0 


 


8/18/18 10:37 36.5 64 20 142/79 (100) 97 Nasal Cannula 3.0 


 


8/18/18 08:00     97 Nasal Cannula 3.0 


 


8/18/18 08:00     95 Nasal Cannula 3.0 


 


8/18/18 07:43 36.8 63 20 169/89 (115) 95 Nasal Cannula 3.0 


 


8/18/18 03:46 37.0 68 18 139/78 (98) 96 Room Air  


 


8/18/18 00:17     98 Nasal Cannula 3.0 


 


8/17/18 22:59 36.7 68 18 175/79 (111) 93 Nasal Cannula  


 


8/17/18 21:15 37.2 68 20 149/82 (104) 94 Nasal Cannula 3.0 


 


8/17/18 20:00     94 Nasal Cannula 3.0 


 


8/17/18 19:31 37.2 53 20 171/84 (113) 94 Nasal Cannula 3.0 


 


8/17/18 17:29     95 Nasal Cannula 3.0 


 


8/17/18 17:16 36.5 68 18 179/96    











Physical Exam


General Appearance:  no apparent distress, + pertinent finding (pleasantly 

demented)


Respiratory/Chest:  no respiratory distress, no accessory muscle use, + 

decreased breath sounds


Cardiovascular:  regular rate, rhythm, no edema, no murmur





Laboratory Results





Last 24 Hours








Test


  8/17/18


21:00 8/17/18


21:14 8/18/18


03:30 8/18/18


09:02


 


Urine Color DK YELLOW    


 


Urine Appearance CLEAR    


 


Urine pH 5.5    


 


Urine Specific Gravity > 1.045    


 


Urine Protein 2+    


 


Urine Glucose (UA) NEG    


 


Urine Ketones TRACE    


 


Urine Occult Blood 1+    


 


Urine Nitrite NEG    


 


Urine Bilirubin NEG    


 


Urine Urobilinogen POS    


 


Urine Leukocyte Esterase NEG    


 


Urine WBC (Auto) 1-5 /hpf    


 


Urine RBC (Auto) 5-10 /hpf    


 


Urine Hyaline Casts (Auto) 1-5 /lpf    


 


Urine Epithelial Cells (Auto) >30 /lpf    


 


Urine Bacteria (Auto) NEG    


 


Activated Partial


Thromboplast Time 


  67.1 SECONDS 


  92.6 SECONDS 


  


 


 


Partial Thromboplastin Ratio  2.6  3.6  


 


Troponin I  0.016 ng/ml  0.028 ng/ml  < 0.015 ng/ml 


 


White Blood Count   8.31 K/uL  


 


Red Blood Count   4.06 M/uL  


 


Hemoglobin   12.6 g/dL  


 


Hematocrit   38.9 %  


 


Mean Corpuscular Volume   95.8 fL  


 


Mean Corpuscular Hemoglobin   31.0 pg  


 


Mean Corpuscular Hemoglobin


Concent 


  


  32.4 g/dl 


  


 


 


RDW Standard Deviation   49.4 fL  


 


RDW Coefficient of Variation   14.0 %  


 


Platelet Count   135 K/uL  


 


Mean Platelet Volume   9.7 fL  


 


Sodium Level   143 mmol/L  


 


Potassium Level   3.6 mmol/L  


 


Chloride Level   106 mmol/L  


 


Carbon Dioxide Level   29 mmol/L  


 


Anion Gap   8.0 mmol/L  


 


Blood Urea Nitrogen   19 mg/dl  


 


Creatinine   0.72 mg/dl  


 


Est Creatinine Clear Calc


Drug Dose 


  


  92.8 ml/min 


  


 


 


Estimated GFR (


American) 


  


  103.6 


  


 


 


Estimated GFR (Non-


American 


  


  89.3 


  


 


 


BUN/Creatinine Ratio   27.1  


 


Random Glucose   95 mg/dl  


 


Calcium Level   8.5 mg/dl  


 


Magnesium Level   2.1 mg/dl  


 


Test


  8/18/18


10:47 8/18/18


15:12 


  


 


 


Activated Partial


Thromboplast Time 68.1 SECONDS 


  


  


  


 


 


Partial Thromboplastin Ratio 2.6    


 


Troponin I  < 0.015 ng/ml   











Assessment and Plan


This is a 78 year old male with dementia and a past medical history of multi-

vessel CAD managed medically, ischemic cardiomyopathy and mild chronic systolic 

CHF with EF around 40-45%, complete heart block s/p permanent pacemaker in situ

, PMR on long-term steroids, gout, BPH - presents with R sided chest pain and 

subsequently found to have acute bilateral pulmonary embolism.





Acute Bilateral PE


8/18


- echo performed, and there is some RV enlargement noted


- clinically he is doing better


- patient's wife is agreeable to cost of Xarelto


- will add Xarelto 15mg BID for 21 days and then 20mg daily


- ambulate in hallways


- wean O2 as tolerated


- two step exercise in AM





8/17


- patient had a chest CT - acute, extensive bilateral PE


- plan to start IV heparin


- case management consulted to find the cost of Xarelto vs. Eliquis


- wife does not want to start patient on Coumadin


- will obtain echo


- oxygen as needed


- pain medications as needed





Multi-vessel CAD


- managed medically


- last cardiac cath in September 2017


- continue aspirin, statin, b-blocker





Ischemic Cardiomyopathy


Mild Chronic Systolic CHF


- EF ~ 40-45%


- will check an echo


- avoiding IV fluids for now


- continue b-blocker and ARB





Complete Heart Block s/p PPM





Polymyalgia Rheumatica


- continue steroid use





BPH


- continue home medications


- check UA; wife states he's having more urinary frequency





FULL CODE

## 2018-08-19 VITALS
DIASTOLIC BLOOD PRESSURE: 70 MMHG | HEART RATE: 62 BPM | SYSTOLIC BLOOD PRESSURE: 138 MMHG | TEMPERATURE: 99.5 F | OXYGEN SATURATION: 93 %

## 2018-08-19 VITALS
HEART RATE: 62 BPM | SYSTOLIC BLOOD PRESSURE: 138 MMHG | TEMPERATURE: 99.5 F | DIASTOLIC BLOOD PRESSURE: 70 MMHG | OXYGEN SATURATION: 93 %

## 2018-08-19 VITALS
TEMPERATURE: 98.78 F | DIASTOLIC BLOOD PRESSURE: 87 MMHG | SYSTOLIC BLOOD PRESSURE: 145 MMHG | OXYGEN SATURATION: 91 % | HEART RATE: 71 BPM

## 2018-08-19 VITALS
HEART RATE: 70 BPM | TEMPERATURE: 98.24 F | DIASTOLIC BLOOD PRESSURE: 86 MMHG | SYSTOLIC BLOOD PRESSURE: 146 MMHG | OXYGEN SATURATION: 95 %

## 2018-08-19 VITALS — OXYGEN SATURATION: 94 %

## 2018-08-19 LAB
BUN SERPL-MCNC: 20 MG/DL (ref 7–18)
CALCIUM SERPL-MCNC: 8.8 MG/DL (ref 8.5–10.1)
CO2 SERPL-SCNC: 28 MMOL/L (ref 21–32)
CREAT SERPL-MCNC: 0.79 MG/DL (ref 0.6–1.4)
EOSINOPHIL NFR BLD AUTO: 151 K/UL (ref 130–400)
GLUCOSE SERPL-MCNC: 80 MG/DL (ref 70–99)
HCT VFR BLD CALC: 40.9 % (ref 42–52)
HGB BLD-MCNC: 13 G/DL (ref 14–18)
MCH RBC QN AUTO: 30.7 PG (ref 25–34)
MCHC RBC AUTO-ENTMCNC: 31.8 G/DL (ref 32–36)
MCV RBC AUTO: 96.7 FL (ref 80–100)
PMV BLD AUTO: 9.7 FL (ref 7.4–10.4)
POTASSIUM SERPL-SCNC: 3.6 MMOL/L (ref 3.5–5.1)
PTT PATIENT: 38.3 SECONDS (ref 21–31)
RED CELL DISTRIBUTION WIDTH CV: 13.8 % (ref 11.5–14.5)
RED CELL DISTRIBUTION WIDTH SD: 49.1 FL (ref 36.4–46.3)
SODIUM SERPL-SCNC: 142 MMOL/L (ref 136–145)
WBC # BLD AUTO: 6.97 K/UL (ref 4.8–10.8)

## 2018-08-19 RX ADMIN — PANTOPRAZOLE SCH MG: 40 TABLET, DELAYED RELEASE ORAL at 08:44

## 2018-08-19 RX ADMIN — MEMANTINE HYDROCHLORIDE SCH MG: 10 TABLET ORAL at 08:43

## 2018-08-19 RX ADMIN — ALLOPURINOL SCH MG: 300 TABLET ORAL at 08:44

## 2018-08-19 RX ADMIN — DOCUSATE SODIUM SCH MG: 100 CAPSULE, LIQUID FILLED ORAL at 08:44

## 2018-08-19 RX ADMIN — RIVAROXABAN SCH MG: 15 TABLET, FILM COATED ORAL at 08:44

## 2018-08-19 RX ADMIN — FINASTERIDE SCH MG: 5 TABLET, FILM COATED ORAL at 08:43

## 2018-08-19 RX ADMIN — ATORVASTATIN CALCIUM SCH MG: 40 TABLET, FILM COATED ORAL at 08:44

## 2018-08-19 RX ADMIN — LOSARTAN POTASSIUM SCH MG: 50 TABLET, FILM COATED ORAL at 08:43

## 2018-08-19 RX ADMIN — Medication SCH MG: at 08:43

## 2018-08-19 RX ADMIN — Medication SCH TAB: at 08:44

## 2018-08-19 NOTE — DISCHARGE INSTRUCTIONS
Discharge Instructions


Date of Service


Aug 19, 2018.





Admission


Reason for Admission:  Bilateral Pulmonary Embolism





Discharge


Discharge Diagnosis / Problem:  Bilateral Pulmonary Embolism





Discharge Goals


Goal(s):  Decrease discomfort, Improve function, Diagnostic testing, 

Therapeutic intervention





Activity Recommendations


Activity Limitations:  resume your previous activity





.





Instructions / Follow-Up


Instructions / Follow-Up


Please follow-up with Dr. Schwab on September 4 at 12:45PM


* For the lung blood clots - patient will be on Xarelto, he will take 15mg 

twice a day until September 8, then change to Xarelto 20mg daily





Current Hospital Diet


Patient's current hospital diet: AHA Diet (Heart Healthy)





Discharge Diet


Recommended Diet:  AHA Diet (Heart Healthy)





Pending Studies


Studies pending at discharge:  no





Medical Emergencies








.


Who to Call and When:





Medical Emergencies:  If at any time you feel your situation is an emergency, 

please call 911 immediately.





.





Non-Emergent Contact


Non-Emergency issues call your:  Primary Care Provider





.


.








"Provider Documentation" section prepared by Bhavya Meza.








.

## 2018-08-19 NOTE — DISCHARGE SUMMARY
Discharge Summary


Date of Service


Aug 19, 2018.





Discharge Summary


Admission Date:


Aug 17, 2018 at 15:24


Discharge Date:  Aug 19, 2018


Discharge Disposition:  Home


Principal Diagnosis:


Acute Bilateral PE





Multi-vessel CAD





Ischemic Cardiomyopathy


Mild Chronic Systolic CHF





Complete Heart Block s/p PPM


Polymyalgia Rheumatica


BPH





Medication Reconciliation


New Medications:  


Docusate Sodium (Docusate Sodium) 100 Mg Cap


100 MG PO BID for 30 Days, #60 CAP





Rivaroxaban (Xarelto) 15 Mg Tab


15 MG PO BID for 20 Days, #40 TAB





 


Continued Medications:  


Allopurinol (Zyloprim) 300 Mg Tab


300 MG PO DAILY, TAB





Aspirin (Aspirin Ec) 81 Mg Tab


81 MG PO DAILY





Atenolol (Tenormin) 50 Mg Tab


50 MG PO QPM, TAB





Atorvastatin (Lipitor) 80 Mg Tab


80 MG PO DAILY, TAB





Calcium/Vitamin D (Os-Jayy 500 Plus D)  Tab


1 TAB PO DAILY, TAB





Finasteride (Proscar) 5 Mg Tab


5 MG PO DAILY, TAB





Hydrocodone/Acetaminophen 5MG/325MG (Norco 5MG/325MG)  Tab


1 TABLET PO Q6 PRN for Pain, TAB


PRN PAIN


Losartan Potassium (Cozaar) 50 Mg Tab


50 MG PO DAILY, TAB





Memantine (Namenda) 10 Mg Tab


10 MG PO BID, TAB





Nitroglycerin (Nitrostat) 0.4 Mg Tab


0.4 MG UT PRN, BTL





Pantoprazole (Protonix) 40 Mg Tab


40 MG PO DAILY, #30 TAB





Prednisone (Prednisone) 5 Mg Tab


7.5 MG PO DAILY, TAB











Admission Information


HPI (per Admitting provider):


This is a 78 year old male with dementia and a past medical history of multi-

vessel CAD managed medically, ischemic cardiomyopathy and mild chronic systolic 

CHF with EF around 40-45%, complete heart block s/p permanent pacemaker in situ

, PMR on long-term steroids, gout, BPH - presents with R sided chest pain. 

Patient is coming from the [facility] [place]. Most of the history is obtained by the 

patient's wife who is at bedside due to patient's underlying dementia. Wife 

states that the patient was lifting something heavy and had some R chest pain. 

Initially they thought nothing of it and thought it was related to a muscle 

pull - but the pain persisted. Upon arrival to the ED, chest CT was performed 

and he was found to have an extensive bilateral PE. Patient is laying 

comfortably. Requiring oxygen use as his O2 saturations were in the upper 80s 

on room air. Denies any other issues at this time.


Physical Exam (per Admitting):  


   General Appearance:  no apparent distress, + pertinent finding (pleasantly 

demented)


   Head:  normocephalic, atraumatic


   Eyes:  normal inspection


   ENT:  hearing grossly normal


   Neck:  supple


   Respiratory/Chest:  no respiratory distress, no accessory muscle use, + 

decreased breath sounds, + pertinent finding (+pacemaker, L chest wall)


   Cardiovascular:  regular rate, rhythm, no edema, no murmur


   Abdomen/GI:  normal bowel sounds, non tender, soft


   Back:  no CVA tenderness, no muscle spasm


   Extremities/Musculoskelatal:  normal inspection, no calf tenderness, normal 

capillary refill, no pedal edema, normal range of motion


   Neurologic/Psych:  CNs II-XII nml as tested, no motor/sensory deficits, alert

, normal mood/affect, oriented x 3


   Skin:  normal color


   Lymphatic:  no adenopathy





Hospital Course


This is a 78 year old male with dementia and a past medical history of multi-

vessel CAD managed medically, ischemic cardiomyopathy and mild chronic systolic 

CHF with EF around 40-45%, complete heart block s/p permanent pacemaker in situ

, PMR on long-term steroids, gout, BPH - presents with R sided chest pain and 

subsequently found to have acute bilateral pulmonary embolism.





Acute Bilateral PE


8/19


- started on Xarelto, IV heparin stopped


- two step performed, no oxygen needed


- no pain noted, no distress noted


- plan for d/c home with Xarelto 15mg BID x 20 more days and then 20mg daily





8/18


- echo performed, and there is some RV enlargement noted


- clinically he is doing better


- patient's wife is agreeable to cost of Xarelto


- will add Xarelto 15mg BID for 21 days and then 20mg daily


- ambulate in hallways


- wean O2 as tolerated


- two step exercise in AM





8/17


- patient had a chest CT - acute, extensive bilateral PE


- plan to start IV heparin


- case management consulted to find the cost of Xarelto vs. Eliquis


- wife does not want to start patient on Coumadin


- will obtain echo


- oxygen as needed


- pain medications as needed





Multi-vessel CAD


- managed medically


- last cardiac cath in September 2017


- continue aspirin, statin, b-blocker





Ischemic Cardiomyopathy


Mild Chronic Systolic CHF


- EF ~ 40-45%


- will check an echo


- avoiding IV fluids for now


- continue b-blocker and ARB





Complete Heart Block s/p PPM





Polymyalgia Rheumatica


- continue steroid use





BPH


- continue home medications


- check UA; wife states he's having more urinary frequency





FULL CODE


Total time spent on discharge = 50 minutes


This includes examination of the patient, discharge planning, medication 

reconciliation, and communication with other providers.





Discharge Instructions


Please follow-up with Dr. Schwab on September 4 at 12:45PM


* For the lung blood clots - patient will be on Xarelto, he will take 15mg 

twice a day until September 8, then change to Xarelto 20mg daily

## 2018-08-19 NOTE — PROGRESS NOTE
Subjective


Date of Service:


Aug 19, 2018.


Subjective


Pt evaluation today including:  conversation w/ patient, conversation w/ family

, physical exam, lab review, review of studies, review of inpatient medication 

list


Saw/examined the patient in room 237


He's doing much better today


Denies shortness of breath/chest pain


Ambulated with respiratory therapy and no oxygen needed


No other signs of distress





Problem List


Medical Problems:


(1) Bilateral pulmonary embolism


Status: Acute  











Medications





Current Inpatient Medications








 Medications


  (Trade)  Dose


 Ordered  Sig/Anastacio


 Route  Start Time


 Stop Time Status Last Admin


Dose Admin


 


 Ioversol


  (Optiray 320)  100 ml  UD  PRN


 IV  8/17/18 13:30


 8/21/18 13:29   


 


 


 Acetaminophen


  (Tylenol Tab)  650 mg  Q4H  PRN


 PO  8/17/18 15:30


 9/16/18 15:29   


 


 


 Al Hydrox/Mg


 Hydrox/Simethicone


  (Maalox Max Susp)  15 ml  Q4H  PRN


 PO  8/17/18 15:30


 9/16/18 15:29   


 


 


 Magnesium


 Hydroxide


  (Milk Of


 Magnesia Susp)  30 ml  Q12H  PRN


 PO  8/17/18 15:30


 9/16/18 15:29   


 


 


 Ondansetron HCl


  (Zofran Inj)  4 mg  Q6H  PRN


 IV  8/17/18 15:30


 9/16/18 15:29   


 


 


 Allopurinol


  (Zyloprim Tab)  300 mg  DAILY


 PO  8/18/18 09:00


 9/17/18 08:59  8/19/18 08:44


300 MG


 


 Aspirin


  (Ecotrin Tab)  81 mg  DAILY


 PO  8/18/18 09:00


 9/17/18 08:59  8/19/18 08:43


81 MG


 


 Atenolol


  (Tenormin Tab)  50 mg  QPM


 PO  8/17/18 21:00


 9/16/18 20:59  8/18/18 20:04


50 MG


 


 Atorvastatin


 Calcium


  (Lipitor Tab)  80 mg  DAILY


 PO  8/18/18 09:00


 9/17/18 08:59  8/19/18 08:44


80 MG


 


 Calcium/Vitamin D


  (Caltrate Plus


 Tab)  1 tab  DAILY


 PO  8/18/18 09:00


 9/17/18 08:59  8/19/18 08:44


1 TAB


 


 Finasteride


  (Proscar Tab)  5 mg  DAILY


 PO  8/18/18 09:00


 9/17/18 08:59  8/19/18 08:43


5 MG


 


 Acetaminophen/


 Hydrocodone Bitart


  (Norco 5/325 Tab)  1 tab  Q6  PRN


 PO  8/17/18 15:30


 8/31/18 15:29  8/17/18 23:24


1 TAB


 


 Losartan Potassium


  (coZAAR TAB)  50 mg  DAILY


 PO  8/18/18 09:00


 9/17/18 08:59  8/19/18 08:43


50 MG


 


 Memantine


  (Namenda Tab)  10 mg  BID


 PO  8/17/18 21:00


 9/16/18 20:59  8/19/18 08:43


10 MG


 


 Nitroglycerin


  (Nitrostat Tab)  0.4 mg  UD  PRN


 UT  8/17/18 15:30


 9/16/18 15:29   


 


 


 Pantoprazole


 Sodium


  (Protonix Tab)  40 mg  DAILY


 PO  8/18/18 09:00


 9/17/18 08:59  8/19/18 08:44


40 MG


 


 Prednisone


  (PredniSONE TAB)  7.5 mg  DAILY


 PO  8/18/18 09:00


 9/17/18 08:59  8/19/18 08:43


7.5 MG


 


 Morphine Sulfate


  (MoRPHine


 SULFATE INJ)  4 mg  Q3H  PRN


 IV  8/17/18 23:30


 8/31/18 21:29   


 


 


 Rivaroxaban


  (Xarelto Tab)  15 mg  BID


 PO  8/18/18 21:00


 9/17/18 20:59  8/19/18 08:44


15 MG


 


 Docusate Sodium


  (coLACE CAP)  100 mg  BID


 PO  8/18/18 21:00


 9/17/18 20:59  8/19/18 08:44


100 MG


 


 Senna


  (Senokot Tab)  8.6 mg  QAM


 PO  8/19/18 09:00


 9/18/18 08:59  8/19/18 08:43


8.6 MG











Objective


Vital Signs











  Date Time  Temp Pulse Resp B/P (MAP) Pulse Ox O2 Delivery O2 Flow Rate FiO2


 


8/19/18 11:20 37.5 62 18 138/70 (92) 93 Room Air  


 


8/19/18 08:30     94 Room Air  


 


8/19/18 07:16 36.8 70 18 146/86 (106) 95 Nasal Cannula 1.0 


 


8/19/18 03:19 37.1 71 17 145/87 (106) 91 Room Air  


 


8/18/18 23:15 36.3 69 18 137/76 (96) 96 Room Air  


 


8/18/18 20:04 36.6 62 18 154/87 (109) 93 Room Air  


 


8/18/18 20:00     94 Room Air  


 


8/18/18 20:00     94 Room Air  


 


8/18/18 14:57 36.8 68 20 132/85 (101) 97 Nasal Cannula 3.0 











Physical Exam


General Appearance:  no apparent distress, + pertinent finding (pleasantly 

demented)


Respiratory/Chest:  lungs clear, normal breath sounds, no respiratory distress, 

no accessory muscle use


Cardiovascular:  regular rate, rhythm, no edema, no murmur





Laboratory Results





Last 24 Hours








Test


  8/18/18


15:12 8/19/18


06:35


 


Troponin I < 0.015 ng/ml  


 


White Blood Count  6.97 K/uL 


 


Red Blood Count  4.23 M/uL 


 


Hemoglobin  13.0 g/dL 


 


Hematocrit  40.9 % 


 


Mean Corpuscular Volume  96.7 fL 


 


Mean Corpuscular Hemoglobin  30.7 pg 


 


Mean Corpuscular Hemoglobin


Concent 


  31.8 g/dl 


 


 


RDW Standard Deviation  49.1 fL 


 


RDW Coefficient of Variation  13.8 % 


 


Platelet Count  151 K/uL 


 


Mean Platelet Volume  9.7 fL 


 


Activated Partial


Thromboplast Time 


  38.3 SECONDS 


 


 


Partial Thromboplastin Ratio  1.5 


 


Sodium Level  142 mmol/L 


 


Potassium Level  3.6 mmol/L 


 


Chloride Level  106 mmol/L 


 


Carbon Dioxide Level  28 mmol/L 


 


Anion Gap  8.0 mmol/L 


 


Blood Urea Nitrogen  20 mg/dl 


 


Creatinine  0.79 mg/dl 


 


Est Creatinine Clear Calc


Drug Dose 


  84.6 ml/min 


 


 


Estimated GFR (


American) 


  99.7 


 


 


Estimated GFR (Non-


American 


  86.0 


 


 


BUN/Creatinine Ratio  24.7 


 


Random Glucose  80 mg/dl 


 


Calcium Level  8.8 mg/dl 











Assessment and Plan


This is a 78 year old male with dementia and a past medical history of multi-

vessel CAD managed medically, ischemic cardiomyopathy and mild chronic systolic 

CHF with EF around 40-45%, complete heart block s/p permanent pacemaker in situ

, PMR on long-term steroids, gout, BPH - presents with R sided chest pain and 

subsequently found to have acute bilateral pulmonary embolism.





Acute Bilateral PE


8/19


- started on Xarelto, IV heparin stopped


- two step performed, no oxygen needed


- no pain noted, no distress noted


- plan for d/c home with Xarelto 15mg BID x 20 more days and then 20mg daily





8/18


- echo performed, and there is some RV enlargement noted


- clinically he is doing better


- patient's wife is agreeable to cost of Xarelto


- will add Xarelto 15mg BID for 21 days and then 20mg daily


- ambulate in hallways


- wean O2 as tolerated


- two step exercise in AM





8/17


- patient had a chest CT - acute, extensive bilateral PE


- plan to start IV heparin


- case management consulted to find the cost of Xarelto vs. Eliquis


- wife does not want to start patient on Coumadin


- will obtain echo


- oxygen as needed


- pain medications as needed





Multi-vessel CAD


- managed medically


- last cardiac cath in September 2017


- continue aspirin, statin, b-blocker





Ischemic Cardiomyopathy


Mild Chronic Systolic CHF


- EF ~ 40-45%


- will check an echo


- avoiding IV fluids for now


- continue b-blocker and ARB





Complete Heart Block s/p PPM





Polymyalgia Rheumatica


- continue steroid use





BPH


- continue home medications


- check UA; wife states he's having more urinary frequency





FULL CODE